# Patient Record
Sex: MALE | Race: BLACK OR AFRICAN AMERICAN | NOT HISPANIC OR LATINO
[De-identification: names, ages, dates, MRNs, and addresses within clinical notes are randomized per-mention and may not be internally consistent; named-entity substitution may affect disease eponyms.]

---

## 2023-03-23 PROBLEM — Z00.00 ENCOUNTER FOR PREVENTIVE HEALTH EXAMINATION: Status: ACTIVE | Noted: 2023-03-23

## 2023-03-24 ENCOUNTER — APPOINTMENT (OUTPATIENT)
Dept: UROLOGY | Facility: CLINIC | Age: 58
End: 2023-03-24
Payer: COMMERCIAL

## 2023-03-24 VITALS
SYSTOLIC BLOOD PRESSURE: 118 MMHG | HEIGHT: 71 IN | TEMPERATURE: 98 F | BODY MASS INDEX: 24.92 KG/M2 | WEIGHT: 178 LBS | DIASTOLIC BLOOD PRESSURE: 81 MMHG | HEART RATE: 87 BPM | OXYGEN SATURATION: 98 %

## 2023-03-24 PROCEDURE — 99204 OFFICE O/P NEW MOD 45 MIN: CPT | Mod: 25

## 2023-03-24 PROCEDURE — 52000 CYSTOURETHROSCOPY: CPT

## 2023-03-24 RX ORDER — ALFUZOSIN HYDROCHLORIDE 10 MG/1
10 TABLET, EXTENDED RELEASE ORAL
Refills: 0 | Status: ACTIVE | COMMUNITY

## 2023-03-24 NOTE — ASSESSMENT
[FreeTextEntry1] : Assessment: \par Mr. CAITY THOMAS is a 57 year male with bothersome BPH/LUTS, primarily obstructive in quality with poor flow rate and very high PVR. Cystoscopy demonstrated severe trilobar hypertrophy with ~300 cc diverticulum at the bladder dome. \par \par We discussed the following options for managing the patient's lower urinary tract symptoms (LUTS) due to BPH: \par \par (1) Behavioral modification: timed and double voiding, reduced oral fluid intake at night, avoid bladder irritants (caffeine, alcohol, smoking, spicy foods) \par \par (2) Medical therapy: medication classes include alpha-blockers, 5-alpha reductase inhibitors, anticholinergics, PDE-5 inhibitors (Tadalafil is approved for co-treatment of BPH and erectile dysfunction), and combination therapy \par \par (3) Surgical intervention: TURP (transurethral resection of the prostate), UroLIFT (prostatic urethral lift), Rezum (water vapor ablation of prostate), PVP (photovaporization of prostate), enucleation of the prostate, and simple prostatectomy \par \par We also discussed the indications, mechanism of action, and potential side effects of potential medications, as well as the details, risks, and benefits of the relevant surgical procedures. Based on his risk factors, clinical condition, and personal preferences, the most applicable and appropriate option would be surgical intervention. \par \par \par Plan: \par -UA, Ucx\par -CT a/p\par -Follow-up after travel and imaging to discuss surgical management \par \par

## 2023-03-24 NOTE — HISTORY OF PRESENT ILLNESS
[FreeTextEntry1] : Name CAITY THOMAS \par MRN [id number] \par  Jul 1965 \par ------------------------------------------------------------------------------------------------------------------------------------------- \par Date of First Visit: 2023\par Referring Provider/PCP: self (Aris) / ******** \par ------------------------------------------------------------------------------------------------------------------------------------------- \par CC: establish care, bothersome lower urinary tract symptoms (LUTS) \par \par History of Present Illness: CAITY THOMAS is a 57 year male with a several year history of bothersome LUTS. Specifically, he complains of urinary urgency with urge incontinence, nocturia 1-2x/night, sensation of incomplete emptying, intermittency. He feels it is hard to urinate after sex.  He has been on Alfuzosin for 8-9 years for his symptoms, but feels they have worsened over the past month.  States last checked by his PCP in 2023 and was normal. Last KIERSTEN about 1 year ago. Family history significant for prostate cancer - his grandfather and uncle both  of prostate cancer in their 80's.\par \par IPSS : 25 ; bother score = 4\par PVR: 566  \par Uroflow: 9 cc/s (voided 55 cc)\par \par *Does not currently feel full despite large PVR\par \par Other Relevant History: \par Previously taken medication for symptoms - Currently on alfuzosin\par Male sexual health/prostate supplements - None \par Prior history of urinary retention - No\par Prior episodes of prostatitis - No\par Prior urinary tract infections - No \par History of bladder stones - No\par History of prior prostate biopsies - No \par PSA History:  States last checked by his PCP in 2023 and was normal. Last KIERSTEN about 1 year ago.\par \par Cysto: severe trilobar hypertrophy with 2 cm of intravesical prostatic protrusion and very large midline diverticulum at the bladder dome approximately the size of the bladder. \par

## 2023-03-27 LAB
APPEARANCE: ABNORMAL
BACTERIA UR CULT: NORMAL
BACTERIA: NEGATIVE
BILIRUBIN URINE: NEGATIVE
BLOOD URINE: NEGATIVE
COLOR: NORMAL
GLUCOSE QUALITATIVE U: NEGATIVE
HYALINE CASTS: 1 /LPF
KETONES URINE: NEGATIVE
LEUKOCYTE ESTERASE URINE: NEGATIVE
MICROSCOPIC-UA: NORMAL
NITRITE URINE: NEGATIVE
PH URINE: 5.5
PROTEIN URINE: NEGATIVE
RED BLOOD CELLS URINE: 2 /HPF
SPECIFIC GRAVITY URINE: 1.02
SQUAMOUS EPITHELIAL CELLS: 0 /HPF
UROBILINOGEN URINE: NORMAL
WHITE BLOOD CELLS URINE: 0 /HPF

## 2023-04-26 ENCOUNTER — APPOINTMENT (OUTPATIENT)
Dept: UROLOGY | Facility: CLINIC | Age: 58
End: 2023-04-26
Payer: COMMERCIAL

## 2023-04-26 VITALS
SYSTOLIC BLOOD PRESSURE: 117 MMHG | DIASTOLIC BLOOD PRESSURE: 79 MMHG | OXYGEN SATURATION: 98 % | TEMPERATURE: 98.3 F | HEART RATE: 77 BPM

## 2023-04-26 PROCEDURE — 76872 US TRANSRECTAL: CPT

## 2023-04-26 PROCEDURE — 99213 OFFICE O/P EST LOW 20 MIN: CPT

## 2023-04-26 NOTE — ASSESSMENT
[FreeTextEntry1] : Assessment: \par Mr. CAITY THOMAS is a 57 year male with bothersome BPH/LUTS, primarily obstructive in quality with poor flow rate and very high PVR with a mild improvement on alpha-blocker therapy.  TRUS demonstrated an 88 cc gland and cystoscopy demonstrated severe trilobar hypertrophy with ~300 cc diverticulum at the bladder dome. \par \par We discussed the following options for managing the patient's lower urinary tract symptoms (LUTS) due to BPH: \par \par (1) Behavioral modification: timed and double voiding, reduced oral fluid intake at night, avoid bladder irritants (caffeine, alcohol, smoking, spicy foods) \par \par (2) Medical therapy: medication classes include alpha-blockers, 5-alpha reductase inhibitors, anticholinergics, PDE-5 inhibitors (Tadalafil is approved for co-treatment of BPH and erectile dysfunction), and combination therapy \par \par (3) Surgical intervention: TURP (transurethral resection of the prostate), UroLIFT (prostatic urethral lift), Rezum (water vapor ablation of prostate), PVP (photovaporization of prostate), enucleation of the prostate, and simple prostatectomy \par \par We also discussed the indications, mechanism of action, and potential side effects of potential medications, as well as the details, risks, and benefits of the relevant surgical procedures. Based on his risk factors, clinical condition, and personal preferences, the most applicable and appropriate option would be surgical intervention. \par \par \par Plan: \par -Continue alpha Alfuzosin\par -Patient will contact us and inform us of his decision on surgical options: Deciding between TURP and enucleation\par \par

## 2023-04-26 NOTE — HISTORY OF PRESENT ILLNESS
[FreeTextEntry1] : Name CAITY THOMAS \par MRN [id number] \par  Jul 1965 \par ------------------------------------------------------------------------------------------------------------------------------------------- \par Date of First Visit: 2023\par Referring Provider/PCP: self (Aris) / ******** \par ------------------------------------------------------------------------------------------------------------------------------------------- \par CC: establish care, bothersome lower urinary tract symptoms (LUTS) \par \par History of Present Illness: CAITY THOMAS is a 57 year male with a several year history of bothersome LUTS. Specifically, he complains of urinary urgency with urge incontinence, nocturia 1-2x/night, sensation of incomplete emptying, intermittency. He feels it is hard to urinate after sex. He has been on Alfuzosin for 8-9 years for his symptoms, but feels they have worsened over the past month. States last checked by his PCP in 2023 and was normal. Last KIERSTEN about 1 year ago. Family history significant for prostate cancer - his grandfather and uncle both  of prostate cancer in their 80's.\par \par IPSS : 25 ; bother score = 4\par PVR: 566 \par Uroflow: 9 cc/s (voided 55 cc)\par \par *Does not currently feel full despite large PVR\par \par Other Relevant History: \par Previously taken medication for symptoms - Currently on alfuzosin\par Male sexual health/prostate supplements - None \par Prior history of urinary retention - No\par Prior episodes of prostatitis - No\par Prior urinary tract infections - No \par History of bladder stones - No\par History of prior prostate biopsies - No \par PSA History: States last checked by his PCP in 2023 and was normal. Last KIERSTEN about 1 year ago.\par \par Cysto: severe trilobar hypertrophy with 2 cm of intravesical prostatic protrusion and very large midline diverticulum at the bladder dome approximately the size of the bladder. \par -------------------------------------------------------------------------------------------------------------------------------------------\par Interval History (2023): Patient follows up to discuss management options.  Did not get CT scan performed because traveling abroad. TRUS performed in the office today demonstrates an 88 cc gland.

## 2023-04-26 NOTE — PHYSICAL EXAM
[General Appearance - Well Developed] : well developed [General Appearance - Well Nourished] : well nourished [Normal Appearance] : normal appearance [Well Groomed] : well groomed [General Appearance - In No Acute Distress] : no acute distress [Abdomen Soft] : soft [Abdomen Tenderness] : non-tender [Costovertebral Angle Tenderness] : no ~M costovertebral angle tenderness [] : no respiratory distress [Oriented To Time, Place, And Person] : oriented to person, place, and time [Affect] : the affect was normal [Mood] : the mood was normal [Not Anxious] : not anxious [Normal Station and Gait] : the gait and station were normal for the patient's age

## 2023-05-01 ENCOUNTER — NON-APPOINTMENT (OUTPATIENT)
Age: 58
End: 2023-05-01

## 2023-05-05 ENCOUNTER — TRANSCRIPTION ENCOUNTER (OUTPATIENT)
Age: 58
End: 2023-05-05

## 2023-05-08 ENCOUNTER — NON-APPOINTMENT (OUTPATIENT)
Age: 58
End: 2023-05-08

## 2023-05-08 ENCOUNTER — TRANSCRIPTION ENCOUNTER (OUTPATIENT)
Age: 58
End: 2023-05-08

## 2023-05-08 LAB — BACTERIA UR CULT: NORMAL

## 2023-05-10 ENCOUNTER — APPOINTMENT (OUTPATIENT)
Dept: UROLOGY | Facility: CLINIC | Age: 58
End: 2023-05-10
Payer: COMMERCIAL

## 2023-05-10 DIAGNOSIS — N32.3 DIVERTICULUM OF BLADDER: ICD-10-CM

## 2023-05-10 PROCEDURE — 99443: CPT

## 2023-05-10 NOTE — LETTER BODY
[Dear  ___] : Dear  [unfilled], [Courtesy Letter:] : I had the pleasure of seeing your patient, [unfilled], in my office today. [Please see my note below.] : Please see my note below. [Consult Closing:] : Thank you very much for allowing me to participate in the care of this patient.  If you have any questions, please do not hesitate to contact me. [Sincerely,] : Sincerely, [FreeTextEntry3] : Viridiana Glynn MD

## 2023-05-10 NOTE — ASSESSMENT
[FreeTextEntry1] : Assessment: Mr. CAITY THOMAS  is a 57 year year old man with severe LUTS, prostate volume of 88 cc, not responding to alfuzosin and not interested in taking finasteride. This is most likely due to bladder outlet obstruction secondary to his enlarged prostate, although it is difficult to assess the role of bladder dysfunction, contributing to his symptoms.\par \par We discussed different therapeutic options including TURP, PVP, simple prostatectomy (open, laparoscopic or transurethral laser enucleation), Aquablation, Urolift therapy, and Rezum in addition to full continuation of medical therapy. Discussed the issues of incontinence, retrograde ejaculation, erectile dysfunction, irritative voiding symptoms, injury to urethra and bladder, persistence of irritative voiding symptoms, urethral stricture or bladder neck contracture, need for open surgery to repair the injury, erectile dysfunction and infertility.\par \par I made it clear that because of his large prostate size > 80 grams, AUA recommendation is to perform a simple prostatectomy due to the ability to obtain effective and durable improvement in voiding symptoms. A simple prostatectomy can be performed via an open approach, laparoscopic approach or transurethral laser enucleation approach. We discussed the risks and benefits of each approach. Overall, the long term outcomes are similar. However, the laser enucleation procedure has the least morbidity with quickest recovery of the three options. We also discussed Aquablation as an option to preserve ejaculation.\par \par Mr. THOMAS decided to proceed with laser enucleation of prostate followed by prostate morcellation. Therefore, I spent a good amount of time discussing the risks and benefits of this procedure. We discussed potential risks of incontinence, retrograde ejaculation and infertility, erectile dysfunction, irritative voiding symptoms, injury to the urethra and bladder, rare need to convert to an open surgery.\par \par  - OR for HoLEP\par \par \par \par

## 2023-05-10 NOTE — HISTORY OF PRESENT ILLNESS
[Home] : at home, [unfilled] , at the time of the visit. [Medical Office: ([facility])___] : at the medical office located in  [Verbal consent obtained from patient] : the patient, [unfilled] [FreeTextEntry1] : 57 year old man with BPH, LUTS, 88 cc gland, currently taking alfuzosin, referred by Dr. Lorenz for HoLEP. \par \par History of Present Illness (From Dr. Lorenz): CAITY THOMAS is a 57 year male with a several year history of bothersome LUTS. Specifically, he complains of urinary urgency with urge incontinence, nocturia 1-2x/night, sensation of incomplete emptying, intermittency. He feels it is hard to urinate after sex. He has been on Alfuzosin for 8-9 years for his symptoms, but feels they have worsened over the past month. States last checked by his PCP in 2023 and was normal. Last KIERSTEN about 1 year ago. Family history significant for prostate cancer - his grandfather and uncle both  of prostate cancer in their 80's.\par \par IPSS : 25 ; bother score = 4\par PVR: 566 \par Uroflow: 9 cc/s (voided 55 cc)\par \par Cysto: severe trilobar hypertrophy with 2 cm of intravesical prostatic protrusion and very large midline diverticulum at the bladder dome approximately the size of the bladder. \par \par

## 2023-05-31 LAB
ALBUMIN SERPL ELPH-MCNC: 4.8 G/DL
ALP BLD-CCNC: 53 U/L
ALT SERPL-CCNC: 17 U/L
ANION GAP SERPL CALC-SCNC: 12 MMOL/L
APPEARANCE: ABNORMAL
APTT BLD: 31.1 SEC
AST SERPL-CCNC: 17 U/L
BACTERIA: ABNORMAL /HPF
BILIRUB SERPL-MCNC: 0.6 MG/DL
BILIRUBIN URINE: NEGATIVE
BLOOD URINE: ABNORMAL
BUN SERPL-MCNC: 15 MG/DL
CALCIUM SERPL-MCNC: 9.4 MG/DL
CAST: 4 /LPF
CHLORIDE SERPL-SCNC: 104 MMOL/L
CO2 SERPL-SCNC: 24 MMOL/L
COLOR: YELLOW
CREAT SERPL-MCNC: 1.04 MG/DL
EGFR: 83 ML/MIN/1.73M2
EPITHELIAL CELLS: 7 /HPF
GLUCOSE QUALITATIVE U: NEGATIVE MG/DL
GLUCOSE SERPL-MCNC: 101 MG/DL
INR PPP: 1.05 RATIO
KETONES URINE: NEGATIVE MG/DL
LEUKOCYTE ESTERASE URINE: ABNORMAL
MICROSCOPIC-UA: NORMAL
NITRITE URINE: POSITIVE
PH URINE: 5.5
POTASSIUM SERPL-SCNC: 4.2 MMOL/L
PROT SERPL-MCNC: 7 G/DL
PROTEIN URINE: 30 MG/DL
PT BLD: 12.2 SEC
RED BLOOD CELLS URINE: 0 /HPF
REVIEW: NORMAL
SODIUM SERPL-SCNC: 141 MMOL/L
SPECIFIC GRAVITY URINE: 1.02
UROBILINOGEN URINE: 0.2 MG/DL
WHITE BLOOD CELLS URINE: 2384 /HPF

## 2023-06-01 LAB — BACTERIA UR CULT: NORMAL

## 2023-06-02 ENCOUNTER — TRANSCRIPTION ENCOUNTER (OUTPATIENT)
Age: 58
End: 2023-06-02

## 2023-06-02 ENCOUNTER — LABORATORY RESULT (OUTPATIENT)
Age: 58
End: 2023-06-02

## 2023-06-05 ENCOUNTER — TRANSCRIPTION ENCOUNTER (OUTPATIENT)
Age: 58
End: 2023-06-05

## 2023-06-06 ENCOUNTER — TRANSCRIPTION ENCOUNTER (OUTPATIENT)
Age: 58
End: 2023-06-06

## 2023-06-09 ENCOUNTER — APPOINTMENT (OUTPATIENT)
Dept: INTERNAL MEDICINE | Facility: CLINIC | Age: 58
End: 2023-06-09
Payer: COMMERCIAL

## 2023-06-09 ENCOUNTER — NON-APPOINTMENT (OUTPATIENT)
Age: 58
End: 2023-06-09

## 2023-06-09 VITALS
HEART RATE: 83 BPM | HEIGHT: 71 IN | OXYGEN SATURATION: 98 % | TEMPERATURE: 98.9 F | BODY MASS INDEX: 25.06 KG/M2 | WEIGHT: 179 LBS

## 2023-06-09 VITALS — DIASTOLIC BLOOD PRESSURE: 81 MMHG | SYSTOLIC BLOOD PRESSURE: 135 MMHG

## 2023-06-09 DIAGNOSIS — Z01.818 ENCOUNTER FOR OTHER PREPROCEDURAL EXAMINATION: ICD-10-CM

## 2023-06-09 DIAGNOSIS — Z83.3 FAMILY HISTORY OF DIABETES MELLITUS: ICD-10-CM

## 2023-06-09 DIAGNOSIS — Z80.42 FAMILY HISTORY OF MALIGNANT NEOPLASM OF PROSTATE: ICD-10-CM

## 2023-06-09 DIAGNOSIS — Z87.828 PERSONAL HISTORY OF OTHER (HEALED) PHYSICAL INJURY AND TRAUMA: ICD-10-CM

## 2023-06-09 DIAGNOSIS — Z83.438 FAMILY HISTORY OF OTHER DISORDER OF LIPOPROTEIN METABOLISM AND OTHER LIPIDEMIA: ICD-10-CM

## 2023-06-09 DIAGNOSIS — E78.5 HYPERLIPIDEMIA, UNSPECIFIED: ICD-10-CM

## 2023-06-09 DIAGNOSIS — I10 ESSENTIAL (PRIMARY) HYPERTENSION: ICD-10-CM

## 2023-06-09 DIAGNOSIS — Z78.9 OTHER SPECIFIED HEALTH STATUS: ICD-10-CM

## 2023-06-09 DIAGNOSIS — Z82.49 FAMILY HISTORY OF ISCHEMIC HEART DISEASE AND OTHER DISEASES OF THE CIRCULATORY SYSTEM: ICD-10-CM

## 2023-06-09 PROCEDURE — 93000 ELECTROCARDIOGRAM COMPLETE: CPT

## 2023-06-09 PROCEDURE — 99203 OFFICE O/P NEW LOW 30 MIN: CPT | Mod: 25

## 2023-06-09 RX ORDER — LISINOPRIL 20 MG/1
20 TABLET ORAL DAILY
Qty: 1 | Refills: 1 | Status: ACTIVE | COMMUNITY

## 2023-06-09 RX ORDER — ATORVASTATIN CALCIUM 20 MG/1
20 TABLET, FILM COATED ORAL
Qty: 1 | Refills: 1 | Status: ACTIVE | COMMUNITY

## 2023-06-09 NOTE — ASSESSMENT
[High Risk Surgery - Intraperitoneal, Intrathoracic or Supringuinal Vascular Procedures] : High Risk Surgery - Intraperitoneal, Intrathoracic or Supringuinal Vascular Procedures - No (0) [Ischemic Heart Disease] : Ischemic Heart Disease - No (0) [Congestive Heart Failure] : Congestive Heart Failure - No (0) [Prior Cerebrovascular Accident or TIA] : Prior Cerebrovascular Accident or TIA - No (0) [Creatinine >= 2mg/dL (1 Point)] : Creatinine >= 2mg/dL - No (0) [Insulin-dependent Diabetic (1 Point)] : Insulin-dependent Diabetic - No (0) [0] : 0 , RCRI Class: I, Risk of Post-Op Cardiac Complications: 3.9%, 95% CI for Risk Estimate: 2.8% - 5.4% [Patient Optimized for Surgery] : Patient optimized for surgery [Modify medications prior to procedure] : Modify medications prior to procedure [FreeTextEntry4] : Mr. CAITY THOMAS is a 57 year old male with history of HTN, HLD, and BPH w/ LUTS presenting today to the [facility] prior to prostate laser enucleation. ECG, recent TTE, and labs reviewed. He is considered low risk for low risk procedure.

## 2023-06-09 NOTE — HISTORY OF PRESENT ILLNESS
[No Pertinent Cardiac History] : no history of aortic stenosis, atrial fibrillation, coronary artery disease, recent myocardial infarction, or implantable device/pacemaker [No Pertinent Pulmonary History] : no history of asthma, COPD, sleep apnea, or smoking [No Adverse Anesthesia Reaction] : no adverse anesthesia reaction in self or family member [(Patient denies any chest pain, claudication, dyspnea on exertion, orthopnea, palpitations or syncope)] : Patient denies any chest pain, claudication, dyspnea on exertion, orthopnea, palpitations or syncope [Good (7-10 METs)] : Good (7-10 METs) [Chronic Anticoagulation] : no chronic anticoagulation [Chronic Kidney Disease] : no chronic kidney disease [Diabetes] : no diabetes [FreeTextEntry1] : Erika [FreeTextEntry2] : 6/22/23 [FreeTextEntry3] : Dr. Glynn [FreeTextEntry4] : Mr. CAITY THOMAS is a 57 year old male with history of HTN, HLD, and BPH w/ LUTS presenting today to the [facility] prior to prostate laser enucleation. He follows with his cardiologist for annual ECHO, but denies any other health concerns outside of his urinary symptoms.  [FreeTextEntry7] : TTE Dec 2022: normal LV size and systolic function. Grade I diastolic dysfunction. No significant valvular disease.  [FreeTextEntry8] : exercising 5 days a week (jumping jacks, jogging, weight lifting)

## 2023-06-12 ENCOUNTER — TRANSCRIPTION ENCOUNTER (OUTPATIENT)
Age: 58
End: 2023-06-12

## 2023-06-21 ENCOUNTER — TRANSCRIPTION ENCOUNTER (OUTPATIENT)
Age: 58
End: 2023-06-21

## 2023-06-21 VITALS
WEIGHT: 182.98 LBS | RESPIRATION RATE: 16 BRPM | DIASTOLIC BLOOD PRESSURE: 86 MMHG | TEMPERATURE: 97 F | SYSTOLIC BLOOD PRESSURE: 127 MMHG | HEIGHT: 71 IN | HEART RATE: 60 BPM | OXYGEN SATURATION: 100 %

## 2023-06-21 NOTE — ASU PATIENT PROFILE, ADULT - NSICDXPASTMEDICALHX_GEN_ALL_CORE_FT
PAST MEDICAL HISTORY:  ACL tear     Bladder diverticulum     BPH (benign prostatic hyperplasia)     HLD (hyperlipidemia)     HTN (hypertension)

## 2023-06-21 NOTE — ASU PATIENT PROFILE, ADULT - FALL HARM RISK - UNIVERSAL INTERVENTIONS
Bed in lowest position, wheels locked, appropriate side rails in place/Call bell, personal items and telephone in reach/Instruct patient to call for assistance before getting out of bed or chair/Non-slip footwear when patient is out of bed/[place] to call system/Physically safe environment - no spills, clutter or unnecessary equipment/Purposeful Proactive Rounding/Room/bathroom lighting operational, light cord in reach

## 2023-06-22 ENCOUNTER — TRANSCRIPTION ENCOUNTER (OUTPATIENT)
Age: 58
End: 2023-06-22

## 2023-06-22 ENCOUNTER — RESULT REVIEW (OUTPATIENT)
Age: 58
End: 2023-06-22

## 2023-06-22 ENCOUNTER — APPOINTMENT (OUTPATIENT)
Dept: UROLOGY | Facility: HOSPITAL | Age: 58
End: 2023-06-22

## 2023-06-22 ENCOUNTER — INPATIENT (INPATIENT)
Facility: HOSPITAL | Age: 58
LOS: 0 days | Discharge: HOME | DRG: 714 | End: 2023-06-23
Attending: SURGERY | Admitting: SURGERY
Payer: COMMERCIAL

## 2023-06-22 DIAGNOSIS — Z98.890 OTHER SPECIFIED POSTPROCEDURAL STATES: Chronic | ICD-10-CM

## 2023-06-22 PROCEDURE — 52649 PROSTATE LASER ENUCLEATION: CPT

## 2023-06-22 PROCEDURE — 88305 TISSUE EXAM BY PATHOLOGIST: CPT | Mod: 26

## 2023-06-22 DEVICE — MOCELLATOR ROTATION SINGLEUSE 4.75: Type: IMPLANTABLE DEVICE | Status: FUNCTIONAL

## 2023-06-22 DEVICE — LASER FIBER MOSES 550 D/F/L: Type: IMPLANTABLE DEVICE | Status: FUNCTIONAL

## 2023-06-22 RX ORDER — FLUCONAZOLE 150 MG/1
1 TABLET ORAL
Qty: 5 | Refills: 0
Start: 2023-06-22 | End: 2023-06-26

## 2023-06-22 RX ORDER — SODIUM CHLORIDE 9 MG/ML
1000 INJECTION, SOLUTION INTRAVENOUS
Refills: 0 | Status: DISCONTINUED | OUTPATIENT
Start: 2023-06-22 | End: 2023-06-23

## 2023-06-22 RX ORDER — ACETAMINOPHEN 500 MG
650 TABLET ORAL EVERY 6 HOURS
Refills: 0 | Status: DISCONTINUED | OUTPATIENT
Start: 2023-06-22 | End: 2023-06-23

## 2023-06-22 RX ORDER — ATORVASTATIN CALCIUM 80 MG/1
1 TABLET, FILM COATED ORAL
Refills: 0 | DISCHARGE

## 2023-06-22 RX ORDER — ATORVASTATIN CALCIUM 80 MG/1
20 TABLET, FILM COATED ORAL AT BEDTIME
Refills: 0 | Status: DISCONTINUED | OUTPATIENT
Start: 2023-06-22 | End: 2023-06-23

## 2023-06-22 RX ORDER — FLUCONAZOLE 150 MG/1
200 TABLET ORAL ONCE
Refills: 0 | Status: DISCONTINUED | OUTPATIENT
Start: 2023-06-22 | End: 2023-06-22

## 2023-06-22 RX ORDER — FLUCONAZOLE 150 MG/1
TABLET ORAL
Refills: 0 | Status: DISCONTINUED | OUTPATIENT
Start: 2023-06-22 | End: 2023-06-22

## 2023-06-22 RX ORDER — LISINOPRIL 2.5 MG/1
10 TABLET ORAL DAILY
Refills: 0 | Status: DISCONTINUED | OUTPATIENT
Start: 2023-06-23 | End: 2023-06-23

## 2023-06-22 RX ORDER — LISINOPRIL 2.5 MG/1
1 TABLET ORAL
Refills: 0 | DISCHARGE

## 2023-06-22 RX ORDER — ALFUZOSIN HYDROCHLORIDE 10 MG/1
1 TABLET, EXTENDED RELEASE ORAL
Refills: 0 | DISCHARGE

## 2023-06-22 RX ORDER — FLUCONAZOLE 150 MG/1
200 TABLET ORAL DAILY
Refills: 0 | Status: DISCONTINUED | OUTPATIENT
Start: 2023-06-23 | End: 2023-06-23

## 2023-06-22 RX ORDER — ONDANSETRON 8 MG/1
4 TABLET, FILM COATED ORAL ONCE
Refills: 0 | Status: COMPLETED | OUTPATIENT
Start: 2023-06-22 | End: 2023-06-22

## 2023-06-22 RX ADMIN — ONDANSETRON 4 MILLIGRAM(S): 8 TABLET, FILM COATED ORAL at 20:02

## 2023-06-22 NOTE — ASU DISCHARGE PLAN (ADULT/PEDIATRIC) - CARE PROVIDER_API CALL
Viridiana Glynn  Urology  [address] [place] [id number]  Phone: [phone]  Fax: [phone]  Follow Up Time:

## 2023-06-22 NOTE — BRIEF OPERATIVE NOTE - NSICDXBRIEFPROCEDURE_GEN_ALL_CORE_FT
PROCEDURES:  Enucleation, prostate, using laser, with morcellation 22-Jun-2023 19:12:36  Nasim Watkins

## 2023-06-22 NOTE — ASU DISCHARGE PLAN (ADULT/PEDIATRIC) - NS MD DC FALL RISK RISK
For information on Fall & Injury Prevention, visit: [email or web address] OR  [email or web address] OR  https://www.cdc.gov/steadi/patient.html

## 2023-06-22 NOTE — PRE-ANESTHESIA EVALUATION ADULT - NSANTHOSAYNRD_GEN_A_CORE
No. DUNG screening performed.  STOP BANG Legend: 0-2 = LOW Risk; 3-4 = INTERMEDIATE Risk; 5-8 = HIGH Risk

## 2023-06-22 NOTE — BRIEF OPERATIVE NOTE - OPERATION/FINDINGS
Holmium laser enucleation of prostate. White debris, slime-like substance noted on mucosa upon cystoscopy. 20fr 3-way pal on CBI. Please see operative dictation for full details of the case.

## 2023-06-22 NOTE — ASU DISCHARGE PLAN (ADULT/PEDIATRIC) - ASU DC SPECIAL INSTRUCTIONSFT
LASER ENUCLEATION OF PROSTATE    GENERAL: It is common to have blood in your urine after your procedure.  It may be pink or even red; and it is important to increase fluid intake to 2-3L of water per day to keep the urine as clear as possible. Please inform your doctor if you have a significant amount of clot in the urine or if you are unable to void at all.  The urine may clear and then become bloody again especially as you are more physically active. It is not uncommon to have some burning when you urinate, this will gradually improve. With a catheter in place, it is not uncommon to have occasional leakage or urine or blood around the catheter. Please call your urologist if this is excessive and/or the urine is not draining through the catheter into the bag.    CATHETER: Most patients are sent home with a Pal catheter, which continuously drains the urine from the bladder. If you still have a catheter, the nurses will review instructions and care before you go home.     PAIN: You may take Tylenol (acetaminophen) 650-975mg and/or Motrin (ibuprofen) 400-600mg, both available over the counter, for pain every 6 hours as needed. Do not exceed 4000mg of Tylenol (acetaminophen) daily. You may alternate these medications such that you take one or the other every 3 hours for around the clock pain coverage.    ANTIBIOTICS: You were prescribed Augmentin and fluconazole for 5 days, please take these medications as instructed and be sure to complete the entire course. For your convenience, all prescriptions are sent to [facility] on the 1st floor of [facility].     STOOL SOFTENERS: Do not allow yourself to become constipated as straining may cause bleeding. Take stool softeners or a laxative (ex. Miralax, Colace, Senokot, ExLax, etc), available over the counter, if needed.    ANTICOAGULATION: If you are taking any blood thinning medications, please discuss with your urologist prior to restarting these medications unless otherwise specified.    BATHING: You may shower or bathe. If going home with pal, shower only until catheter is removed.    DIET: You may resume your regular diet and regular medication regimen.    ACTIVITY: No heavy lifting or strenuous exercise until you are evaluated at your post-operative appointment. Otherwise, you may return to your usual level of physical activity.    FOLLOW-UP: Please follow up with Dr. Glynn on Friday 6/23/23 for catheter removal. If you did not already schedule your post-operative appointment, please call your urologist to schedule and follow-up appointment.    CALL YOUR UROLOGIST IF: You have any bleeding that does not stop, inability to void >8 hours, fever over 100.4 F, chills, persistent nausea/vomiting, changes in your incision concerning for infection, or if your pain is not controlled on your discharge pain medications.

## 2023-06-23 ENCOUNTER — TRANSCRIPTION ENCOUNTER (OUTPATIENT)
Age: 58
End: 2023-06-23

## 2023-06-23 VITALS
HEART RATE: 74 BPM | OXYGEN SATURATION: 96 % | RESPIRATION RATE: 18 BRPM | SYSTOLIC BLOOD PRESSURE: 119 MMHG | DIASTOLIC BLOOD PRESSURE: 78 MMHG | TEMPERATURE: 98 F

## 2023-06-23 DIAGNOSIS — N40.0 BENIGN PROSTATIC HYPERPLASIA WITHOUT LOWER URINARY TRACT SYMPTOMS: ICD-10-CM

## 2023-06-23 PROBLEM — S83.519A SPRAIN OF ANTERIOR CRUCIATE LIGAMENT OF UNSPECIFIED KNEE, INITIAL ENCOUNTER: Chronic | Status: ACTIVE | Noted: 2023-06-21

## 2023-06-23 PROBLEM — I10 ESSENTIAL (PRIMARY) HYPERTENSION: Chronic | Status: ACTIVE | Noted: 2023-06-21

## 2023-06-23 PROCEDURE — 87086 URINE CULTURE/COLONY COUNT: CPT

## 2023-06-23 PROCEDURE — 88305 TISSUE EXAM BY PATHOLOGIST: CPT

## 2023-06-23 PROCEDURE — C1889: CPT

## 2023-06-23 RX ORDER — FLUCONAZOLE 150 MG/1
1 TABLET ORAL
Qty: 6 | Refills: 0
Start: 2023-06-23

## 2023-06-23 RX ADMIN — Medication 1 TABLET(S): at 06:09

## 2023-06-23 RX ADMIN — LISINOPRIL 10 MILLIGRAM(S): 2.5 TABLET ORAL at 06:08

## 2023-06-23 RX ADMIN — FLUCONAZOLE 200 MILLIGRAM(S): 150 TABLET ORAL at 17:26

## 2023-06-23 RX ADMIN — Medication 1 TABLET(S): at 17:25

## 2023-06-23 NOTE — DISCHARGE NOTE PROVIDER - HOSPITAL COURSE
57M here for BPH s/p laser enucleation on 6/22/23. He tolerated the procedure well. VSS and ambulatory. He passed his TOV.   He will follow up outpatient

## 2023-06-23 NOTE — PATIENT PROFILE ADULT - FUNCTIONAL ASSESSMENT - BASIC MOBILITY 6.
3-calculated by average/Not able to assess (calculate score using [facility] averaging method)

## 2023-06-23 NOTE — DISCHARGE NOTE NURSING/CASE MANAGEMENT/SOCIAL WORK - PATIENT PORTAL LINK FT
You can access the FollowMyHealth Patient Portal offered by [facility] by registering at the following website: [email or web address]. By joining [employer]’s FollowMyHealth portal, you will also be able to view your health information using other applications (apps) compatible with our system.

## 2023-06-23 NOTE — PROGRESS NOTE ADULT - SUBJECTIVE AND OBJECTIVE BOX
INTERVAL HPI/OVERNIGHT EVENTS:  No acute events overnight. Patient was admitted  from PACU secondary to spinal anesthesia wearing off at slow rate. Patient states he is improving and can now wiggle toes, bed knees and has more sensation.    VITALS:    T(F): 97.7 (06-23-23 @ 01:08), Max: 98 (06-22-23 @ 18:43)  HR: 70 (06-23-23 @ 01:08) (52 - 78)  BP: 129/82 (06-23-23 @ 01:08) (110/74 - 133/87)  RR: 17 (06-23-23 @ 01:08) (11 - 22)  SpO2: 96% (06-23-23 @ 01:08) (96% - 100%)  Wt(kg): --    I&O's Detail    22 Jun 2023 07:01  -  23 Jun 2023 04:35  --------------------------------------------------------  IN:    Lactated Ringers: 240 mL  Total IN: 240 mL    OUT:    Indwelling Catheter - Urethral (mL): 930 mL  Total OUT: 930 mL    Total NET: -690 mL          MEDICATIONS:    ANTIBIOTICS:  amoxicillin  500 milliGRAM(s)/clavulanate 1 Tablet(s) Oral every 12 hours  fluconAZOLE   Tablet 200 milliGRAM(s) Oral daily      PAIN CONTROL:  acetaminophen     Tablet .. 650 milliGRAM(s) Oral every 6 hours PRN       MEDS:      HEME/ONC        PHYSICAL EXAM:  General: No acute distress.  Alert and Oriented  Abdominal Exam: soft, non-tender, non-distended    Exam: 3way pal in place, urine clear

## 2023-06-23 NOTE — PROGRESS NOTE ADULT - ASSESSMENT
"Susy Massey  [address]  [place] [address]    RE:  Magy Larry  :  2016  MRN:  [id number]  Date of visit:  January 15, 2019    Dear Dr. Massey:    We had the pleasure of seeing Magy and family today as a known urology patient to me at the [facility] for the history of urinary retention, constipation and infrequent urination.     Magy was last seen by me on 18 after overnight admission and several ER visits related to abdominal pain.  At her ER visit to [facility] on 18 bladder scan noted 400 mL of urine, Magy was able to spontaneously void and had output of 600 mL.  Magy had a history of infrequent voiding and constipation.      Magy has not had any more issues with abdominal pain.  She is voiding every 2-3 hours with prompting, both at home and at .  She is taking 1 capful of miralax daily with [place] type 3-4 stools daily.  Mom has no new concerns today.       On exam:  Height 0.982 m (3' 2.66\"), weight 14.7 kg (32 lb 6.5 oz), head circumference 50.1 cm (19.72\").  Gen: Well appearing child, in no apparent distess  Resp: Breathing is non-labored on room air   CV: Extremities warm  Abd: Soft, non-tender, non-distended.  No masses.  : female external genitalia    Imaging: All studies were reviewed by me today in clinic.  Recent Results (from the past 24 hour(s))   US Renal Complete    Narrative    EXAMINATION: US RENAL COMPLETE, 1/15/2019 3:07 PM.    CLINICAL HISTORY: Urinary retention.    COMPARISON: None available.    FINDINGS:  Right renal length: 7.5 cm. This is within normal limits for age.    Left renal length: 7.4 cm. This is within normal limits for age.    The kidneys are normal in position and echogenicity. There is no  evident calculus or renal scarring. There is no significant urinary  tract dilation. The urinary bladder is well distended and normal in  morphology. The bladder wall is normal. The prevoid bladder volume  measures 53 mL. " 56yo male with PMH of HTN, HLD, BPH s/p prostate enucleation POD#1.  The postvoid bladder volume measures 0.2 mL.            Impression    IMPRESSION:  1. Normal renal ultrasound.  2. Prevoid bladder volume is 53 mm. Post void bladder volume is less  than 1 mL.    I have personally reviewed the examination and initial interpretation  and I agree with the findings.    ADRIANNE HURST MD       Impression:  History of urinary retention, infrequent urination and constipation.  Normal renal ultrasound.    Plan:    1.  Continue daily MiraLax.  All constipation symptoms should be resolved for a minimum of 1 month before changing the medication regimen.  Miralax should then be decreased slowly.  Encourage sitting on the toilet for 5-10 minutes after meals.  2.  Prompted voiding every 2 hours, regardless of the child expressing a need to go.  3.  Keep appropriately hydrated with water.  In this case, I suggested at least 35 ounces per day at baseline.  4.  Encourage Magy to relax as much as possible while peeing.  Exhale slowly or blow a pinwheel or bubbles while peeing to encourage pelvic floor relaxation and full bladder emptying.     No need for on-going follow-up in urology unless Magy has return of symptoms or parents have new genitourinary concerns.    ANDREW Michele, CNP  Pediatric Urology  [facility]

## 2023-06-23 NOTE — PATIENT PROFILE ADULT - FALL HARM RISK - HARM RISK INTERVENTIONS
Assistance with ambulation/Assistance OOB with selected safe patient handling equipment/Communicate Risk of Fall with Harm to all staff/Discuss with provider need for PT consult/Monitor gait and stability/Reinforce activity limits and safety measures with patient and family/Sit up slowly, dangle for a short time, stand at bedside before walking/Tailored Fall Risk Interventions/Use of alarms - bed, chair and/or voice tab/Visual Cue: Yellow wristband and red socks/Bed in lowest position, wheels locked, appropriate side rails in place/Call bell, personal items and telephone in reach/Instruct patient to call for assistance before getting out of bed or chair/Non-slip footwear when patient is out of bed/[place] to call system/Physically safe environment - no spills, clutter or unnecessary equipment/Purposeful Proactive Rounding/Room/bathroom lighting operational, light cord in reach

## 2023-06-23 NOTE — PROGRESS NOTE ADULT - PROBLEM SELECTOR PLAN 1
- s/p prostate enucleation   - continue pal and monitor urine output, TOV this AM   - OOB/IS   - SCDs   - abx: augmentin, fluconazole   - diet: regular

## 2023-06-23 NOTE — DISCHARGE NOTE PROVIDER - NSDCCPCAREPLAN_GEN_ALL_CORE_FT
PRINCIPAL DISCHARGE DIAGNOSIS  Diagnosis: BPH (benign prostatic hyperplasia)  Assessment and Plan of Treatment:

## 2023-06-23 NOTE — DISCHARGE NOTE PROVIDER - NSDCFUSCHEDAPPT_GEN_ALL_CORE_FT
Viridiana Glynn  [facility] Physician [facility]  UROLOGY [address]  Scheduled Appointment: 06/26/2023

## 2023-06-23 NOTE — DISCHARGE NOTE PROVIDER - NSDCMRMEDTOKEN_GEN_ALL_CORE_FT
alfuzosin 10 mg oral tablet, extended release: 1 orally once a day  amoxicillin-clavulanate 500 mg-125 mg oral tablet: 1 tab(s) orally 2 times a day (after meals)  atorvastatin 20 mg oral tablet: 1 orally once a day  fluconazole 200 mg oral tablet: 1 tab(s) orally once a day  lisinopril 10 mg oral tablet: 1 orally

## 2023-06-23 NOTE — DISCHARGE NOTE PROVIDER - NSDCFUADDINST_GEN_ALL_CORE_FT
ENUCLEATION OF PROSTATE    Your ureteral stent was exchanged, please make sure to follow-up outpatient for the stent and for your postop visit.    GENERAL: It is common to have blood in your urine after your procedure.  It may be pink or even red; and it is important to increase fluid intake to 2-3L of water per day to keep the urine as clear as possible. Please inform your doctor if you have a significant amount of clot in the urine or if you are unable to void at all.  The urine may clear and then become bloody again especially as you are more physically active. It is not uncommon to have some burning when you urinate, this will gradually improve.     STOOL SOFTENERS: Do not allow yourself to become constipated as straining may cause bleeding. Take stool softeners or a laxative (ex. Miralax, Colace, Senokot, ExLax, etc), available over the counter, if needed.    ANTICOAGULATION: If you are taking any blood thinning medications, please discuss with your urologist prior to restarting these medications unless otherwise specified.    BATHING: You may shower or bathe.     DIET: You may resume your regular diet and regular medication regimen.    ACTIVITY: No heavy lifting or strenuous exercise until you are evaluated at your post-operative appointment. Otherwise, you may return to your usual level of physical activity.    FOLLOW-UP: If you did not already schedule your post-operative appointment, please call your urologist to schedule and follow-up appointment.    CALL YOUR UROLOGIST IF: You have any bleeding that does not stop, inability to void >8 hours, fever over 100.4 F, chills, persistent nausea/vomiting, changes in your incision concerning for infection, or if your pain is not controlled on your discharge pain medications.       ENUCLEATION OF PROSTATE    GENERAL: It is common to have blood in your urine after your procedure.  It may be pink or even red; and it is important to increase fluid intake to 2-3L of water per day to keep the urine as clear as possible. Please inform your doctor if you have a significant amount of clot in the urine or if you are unable to void at all.  The urine may clear and then become bloody again especially as you are more physically active. It is not uncommon to have some burning when you urinate, this will gradually improve.     STOOL SOFTENERS: Do not allow yourself to become constipated as straining may cause bleeding. Take stool softeners or a laxative (ex. Miralax, Colace, Senokot, ExLax, etc), available over the counter, if needed.    ANTICOAGULATION: If you are taking any blood thinning medications, please discuss with your urologist prior to restarting these medications unless otherwise specified.    BATHING: You may shower or bathe.     DIET: You may resume your regular diet and regular medication regimen.    ACTIVITY: No heavy lifting or strenuous exercise until you are evaluated at your post-operative appointment. Otherwise, you may return to your usual level of physical activity.    FOLLOW-UP: If you did not already schedule your post-operative appointment, please call your urologist to schedule and follow-up appointment.    CALL YOUR UROLOGIST IF: You have any bleeding that does not stop, inability to void >8 hours, fever over 100.4 F, chills, persistent nausea/vomiting, changes in your incision concerning for infection, or if your pain is not controlled on your discharge pain medications.

## 2023-06-23 NOTE — DISCHARGE NOTE PROVIDER - CARE PROVIDER_API CALL
Viridiana Glynn  Urology  [address] [place] [id number]  Phone: [phone]  Fax: [phone]  Follow Up Time:

## 2023-06-23 NOTE — DISCHARGE NOTE NURSING/CASE MANAGEMENT/SOCIAL WORK - NSDCPEFALRISK_GEN_ALL_CORE
For information on Fall & Injury Prevention, visit: [email or web address] OR  [email or web address] OR  https://www.cdc.gov/steadi/patient.html

## 2023-06-23 NOTE — DISCHARGE NOTE PROVIDER - NSDCCPTREATMENT_GEN_ALL_CORE_FT
PRINCIPAL PROCEDURE  Procedure: Enucleation, prostate, using laser, with morcellation  Findings and Treatment:

## 2023-06-25 LAB
CULTURE RESULTS: SIGNIFICANT CHANGE UP
SPECIMEN SOURCE: SIGNIFICANT CHANGE UP

## 2023-06-26 ENCOUNTER — APPOINTMENT (OUTPATIENT)
Dept: UROLOGY | Facility: CLINIC | Age: 58
End: 2023-06-26
Payer: COMMERCIAL

## 2023-06-26 PROBLEM — E78.5 HYPERLIPIDEMIA, UNSPECIFIED: Chronic | Status: ACTIVE | Noted: 2023-06-21

## 2023-06-26 PROBLEM — N40.0 BENIGN PROSTATIC HYPERPLASIA WITHOUT LOWER URINARY TRACT SYMPTOMS: Chronic | Status: ACTIVE | Noted: 2023-06-21

## 2023-06-26 PROBLEM — N32.3 DIVERTICULUM OF BLADDER: Chronic | Status: ACTIVE | Noted: 2023-06-21

## 2023-06-26 LAB
BILIRUB UR QL STRIP: NORMAL
CLARITY UR: CLEAR
COLLECTION METHOD: NORMAL
GLUCOSE UR-MCNC: NORMAL
HCG UR QL: 0.2 EU/DL
HGB UR QL STRIP.AUTO: NORMAL
KETONES UR-MCNC: NORMAL
LEUKOCYTE ESTERASE UR QL STRIP: NORMAL
NITRITE UR QL STRIP: NORMAL
PH UR STRIP: 5.5
PROT UR STRIP-MCNC: 300
SP GR UR STRIP: 1.02

## 2023-06-26 PROCEDURE — 81003 URINALYSIS AUTO W/O SCOPE: CPT | Mod: QW

## 2023-06-26 PROCEDURE — 99024 POSTOP FOLLOW-UP VISIT: CPT

## 2023-06-26 NOTE — ASSESSMENT
[FreeTextEntry1] : Assessment: Mr. CAITY THOMAS is a 57 year year old man with severe LUTS, prostate volume of 88 cc, not responding to alfuzosin, s/p HoLEP 6/22/23. Doing well. Voiding with strong stream. Complete emptying. PVR down to 77 cc from > 500 cc pre-op. \par  - F/U in 2 weeks for UA, IPSS, PVR

## 2023-06-26 NOTE — HISTORY OF PRESENT ILLNESS
[FreeTextEntry1] : 57 year old man with BPH, LUTS, 88 cc gland, currently taking alfuzosin, referred by Dr. Lorenz for HoLEP. \par \par History of Present Illness (From Dr. Lorenz): CAITY THOMAS is a 57 year male with a several year history of bothersome LUTS. Specifically, he complains of urinary urgency with urge incontinence, nocturia 1-2x/night, sensation of incomplete emptying, intermittency. He feels it is hard to urinate after sex. He has been on Alfuzosin for 8-9 years for his symptoms, but feels they have worsened over the past month. States last checked by his PCP in 2023 and was normal. Last KIERSTEN about 1 year ago. Family history significant for prostate cancer - his grandfather and uncle both  of prostate cancer in their 80's.\par \par IPSS : 25 ; bother score = 4\par PVR: 566 \par Uroflow: 9 cc/s (voided 55 cc)\par \par Cysto: severe trilobar hypertrophy with 2 cm of intravesical prostatic protrusion and very large midline diverticulum at the bladder dome approximately the size of the bladder. \par \par 23: S/P HoLEP, procedure uncomplicated. Admitted overnight due to prolonged spinal effect. Successful void trial POD 1.\par \par 23: Doing well. Voiding with strong stream, intermittent hematuria. Mild dysuria. No stress leakage. Strong urge with occasional leakage. \par \par PVR 77 cc\par \par

## 2023-06-28 DIAGNOSIS — N40.0 BENIGN PROSTATIC HYPERPLASIA WITHOUT LOWER URINARY TRACT SYMPTOMS: ICD-10-CM

## 2023-06-28 LAB
APPEARANCE: ABNORMAL
BACTERIA UR CULT: NORMAL
BACTERIA: NEGATIVE /HPF
BILIRUBIN URINE: NEGATIVE
BLOOD URINE: ABNORMAL
CAST: 0 /LPF
COLOR: NORMAL
EPITHELIAL CELLS: 2 /HPF
GLUCOSE QUALITATIVE U: NEGATIVE MG/DL
KETONES URINE: NEGATIVE MG/DL
LEUKOCYTE ESTERASE URINE: ABNORMAL
MICROSCOPIC-UA: NORMAL
NITRITE URINE: NEGATIVE
PH URINE: 6
PROTEIN URINE: 100 MG/DL
RED BLOOD CELLS URINE: 1147 /HPF
SPECIFIC GRAVITY URINE: 1.02
UROBILINOGEN URINE: 0.2 MG/DL
WHITE BLOOD CELLS URINE: 24 /HPF

## 2023-07-03 LAB — SURGICAL PATHOLOGY STUDY: SIGNIFICANT CHANGE UP

## 2023-07-10 ENCOUNTER — APPOINTMENT (OUTPATIENT)
Dept: UROLOGY | Facility: CLINIC | Age: 58
End: 2023-07-10
Payer: COMMERCIAL

## 2023-07-10 DIAGNOSIS — N40.0 BENIGN PROSTATIC HYPERPLASIA WITHOUT LOWER URINARY TRACT SYMPMS: ICD-10-CM

## 2023-07-10 LAB
BILIRUB UR QL STRIP: NORMAL
CLARITY UR: CLEAR
COLLECTION METHOD: NORMAL
GLUCOSE UR-MCNC: NORMAL
HCG UR QL: 1 EU/DL
HGB UR QL STRIP.AUTO: NORMAL
KETONES UR-MCNC: NORMAL
LEUKOCYTE ESTERASE UR QL STRIP: NORMAL
NITRITE UR QL STRIP: NORMAL
PH UR STRIP: 5.5
PROT UR STRIP-MCNC: NORMAL
SP GR UR STRIP: 1.02

## 2023-07-10 PROCEDURE — 81003 URINALYSIS AUTO W/O SCOPE: CPT | Mod: QW

## 2023-07-10 PROCEDURE — 99024 POSTOP FOLLOW-UP VISIT: CPT

## 2023-07-10 PROCEDURE — 51798 US URINE CAPACITY MEASURE: CPT

## 2023-07-10 NOTE — ASSESSMENT
[FreeTextEntry1] : Assessment: Mr. CAITY THOMAS is a 57 year year old man with severe LUTS, prostate volume of 88 cc, not responding to alfuzosin, s/p HoLEP 6/22/23. Path with 17 grams benign prostate tissue. Doing well. Voiding with strong stream. Complete emptying. PVR down to 77 cc from > 500 cc pre-op. \par  - F/U in 3 months for UA, IPSS, PVR, PSA\par

## 2023-07-10 NOTE — HISTORY OF PRESENT ILLNESS
[FreeTextEntry1] : 57 year old man with BPH, LUTS, 88 cc gland, currently taking alfuzosin, referred by Dr. Lorenz for HoLEP. \par \par History of Present Illness (From Dr. Lorenz): CAITY THOMAS is a 57 year male with a several year history of bothersome LUTS. Specifically, he complains of urinary urgency with urge incontinence, nocturia 1-2x/night, sensation of incomplete emptying, intermittency. He feels it is hard to urinate after sex. He has been on Alfuzosin for 8-9 years for his symptoms, but feels they have worsened over the past month. States last checked by his PCP in 2023 and was normal. Last KIERSTEN about 1 year ago. Family history significant for prostate cancer - his grandfather and uncle both  of prostate cancer in their 80's.\par \par IPSS : 25 ; bother score = 4\par PVR: 566 \par Uroflow: 9 cc/s (voided 55 cc)\par \par Cysto: severe trilobar hypertrophy with 2 cm of intravesical prostatic protrusion and very large midline diverticulum at the bladder dome approximately the size of the bladder. \par \par 23: S/P HoLEP, procedure uncomplicated. Admitted overnight due to prolonged spinal effect. Successful void trial POD 1.\par \par 23: Doing well. Voiding with strong stream, intermittent hematuria. Mild dysuria. No stress leakage. Strong urge with occasional leakage. \par \par PVR 77 cc\par \par 7/10/23:\par Patient is 2 weeks s/p HoLEP. Urinating well with strong stream, some intermittent hematuria, no leakage. Denies any fever or chills. Overall, happy.\par \par IPSS 15, QOL 3, PVR 1 cc

## 2023-07-10 NOTE — LETTER BODY
[Dear  ___] : Dear  [unfilled], [Courtesy Letter:] : I had the pleasure of seeing your patient, [unfilled], in my office today. [Please see my note below.] : Please see my note below. [Sincerely,] : Sincerely, [FreeTextEntry3] : Viridiana Glynn MD

## 2024-03-21 NOTE — DISCHARGE NOTE NURSING/CASE MANAGEMENT/SOCIAL WORK - CAREGIVER NAME
Hanna Coates [Normal] : normal gait [de-identified] : No carotid bruits auscultated bilaterally [de-identified] : trace pitting edema bilateral lower extremities.

## 2024-09-23 NOTE — ASU PREOP CHECKLIST - RESPIRATORY RATE (BREATHS/MIN)
16
Quality 431: Preventive Care And Screening: Unhealthy Alcohol Use - Screening: Patient not identified as an unhealthy alcohol user when screened for unhealthy alcohol use using a systematic screening method
Quality 226: Preventive Care And Screening: Tobacco Use: Screening And Cessation Intervention: Patient screened for tobacco use and is an ex/non-smoker
Detail Level: Detailed
Quality 130: Documentation Of Current Medications In The Medical Record: Current Medications Documented

## (undated) DEVICE — TAPE SILK 3"

## (undated) DEVICE — TUBING TUR 2 PRONG

## (undated) DEVICE — DRAINAGE BAG URINARY 4L

## (undated) DEVICE — PACK CYSTO

## (undated) DEVICE — SOL IRR BAG NS 0.9% 3000ML

## (undated) DEVICE — ELCTR CUTTING 24/26FR

## (undated) DEVICE — TUBING SET FOR SUCTION PUMP

## (undated) DEVICE — MEDELA OVERFLOW FILTER DISPOSABLE

## (undated) DEVICE — GLV 7.5 PROTEXIS (WHITE)

## (undated) DEVICE — FOLEY CATH 3-WAY 22FR 30CC LATEX HEMATURIA COUDE

## (undated) DEVICE — CONTAINER TISSUE COLLECTING DISP

## (undated) DEVICE — TUBING RANGER FLUID IRRIGATION SET DISP

## (undated) DEVICE — ELCTR BIVAP BIPOLAR VAPORIZATION 26FR

## (undated) DEVICE — UROVAC